# Patient Record
Sex: FEMALE | Race: WHITE | NOT HISPANIC OR LATINO | URBAN - METROPOLITAN AREA
[De-identification: names, ages, dates, MRNs, and addresses within clinical notes are randomized per-mention and may not be internally consistent; named-entity substitution may affect disease eponyms.]

---

## 2020-01-01 ENCOUNTER — INPATIENT (INPATIENT)
Facility: HOSPITAL | Age: 85
LOS: 0 days | DRG: 871 | End: 2020-05-08
Attending: INTERNAL MEDICINE | Admitting: INTERNAL MEDICINE
Payer: MEDICARE

## 2020-01-01 VITALS — HEART RATE: 58 BPM | RESPIRATION RATE: 28 BRPM | OXYGEN SATURATION: 86 %

## 2020-01-01 VITALS — SYSTOLIC BLOOD PRESSURE: 140 MMHG | HEART RATE: 67 BPM | DIASTOLIC BLOOD PRESSURE: 68 MMHG

## 2020-01-01 LAB
ALBUMIN SERPL ELPH-MCNC: 1.8 G/DL — LOW (ref 3.3–5)
ALBUMIN SERPL ELPH-MCNC: 2.4 G/DL — LOW (ref 3.3–5)
ALP SERPL-CCNC: 111 U/L — SIGNIFICANT CHANGE UP (ref 40–120)
ALP SERPL-CCNC: 139 U/L — HIGH (ref 40–120)
ALT FLD-CCNC: 38 U/L — SIGNIFICANT CHANGE UP (ref 10–45)
ALT FLD-CCNC: 48 U/L — HIGH (ref 10–45)
ANION GAP SERPL CALC-SCNC: 18 MMOL/L — HIGH (ref 5–17)
ANION GAP SERPL CALC-SCNC: 19 MMOL/L — HIGH (ref 5–17)
ANISOCYTOSIS BLD QL: SLIGHT — SIGNIFICANT CHANGE UP
APTT BLD: 31.3 SEC — SIGNIFICANT CHANGE UP (ref 27.5–36.3)
APTT BLD: 57.8 SEC — HIGH (ref 27.5–36.3)
AST SERPL-CCNC: 66 U/L — HIGH (ref 10–40)
AST SERPL-CCNC: 83 U/L — HIGH (ref 10–40)
BASE EXCESS BLDA CALC-SCNC: -26 MMOL/L — LOW (ref -2–3)
BASE EXCESS BLDV CALC-SCNC: -31.4 MMOL/L — SIGNIFICANT CHANGE UP
BASOPHILS # BLD AUTO: 0 K/UL — SIGNIFICANT CHANGE UP (ref 0–0.2)
BASOPHILS # BLD AUTO: 0.03 K/UL — SIGNIFICANT CHANGE UP (ref 0–0.2)
BASOPHILS NFR BLD AUTO: 0 % — SIGNIFICANT CHANGE UP (ref 0–2)
BASOPHILS NFR BLD AUTO: 0.4 % — SIGNIFICANT CHANGE UP (ref 0–2)
BILIRUB SERPL-MCNC: 0.2 MG/DL — SIGNIFICANT CHANGE UP (ref 0.2–1.2)
BILIRUB SERPL-MCNC: 0.4 MG/DL — SIGNIFICANT CHANGE UP (ref 0.2–1.2)
BLD GP AB SCN SERPL QL: NEGATIVE — SIGNIFICANT CHANGE UP
BLD GP AB SCN SERPL QL: NEGATIVE — SIGNIFICANT CHANGE UP
BUN SERPL-MCNC: 127 MG/DL — HIGH (ref 7–23)
BUN SERPL-MCNC: 97 MG/DL — HIGH (ref 7–23)
BURR CELLS BLD QL SMEAR: PRESENT — SIGNIFICANT CHANGE UP
CALCIUM SERPL-MCNC: 7.1 MG/DL — LOW (ref 8.4–10.5)
CALCIUM SERPL-MCNC: 8.4 MG/DL — SIGNIFICANT CHANGE UP (ref 8.4–10.5)
CHLORIDE SERPL-SCNC: 112 MMOL/L — HIGH (ref 96–108)
CHLORIDE SERPL-SCNC: 119 MMOL/L — HIGH (ref 96–108)
CO2 SERPL-SCNC: 12 MMOL/L — LOW (ref 22–31)
CO2 SERPL-SCNC: 6 MMOL/L — CRITICAL LOW (ref 22–31)
CREAT SERPL-MCNC: 3.83 MG/DL — HIGH (ref 0.5–1.3)
CREAT SERPL-MCNC: 5.65 MG/DL — HIGH (ref 0.5–1.3)
CRP SERPL-MCNC: 2.28 MG/DL — HIGH (ref 0–0.4)
D DIMER BLD IA.RAPID-MCNC: HIGH NG/ML DDU
DACRYOCYTES BLD QL SMEAR: SLIGHT — SIGNIFICANT CHANGE UP
EOSINOPHIL # BLD AUTO: 0 K/UL — SIGNIFICANT CHANGE UP (ref 0–0.5)
EOSINOPHIL # BLD AUTO: 0 K/UL — SIGNIFICANT CHANGE UP (ref 0–0.5)
EOSINOPHIL NFR BLD AUTO: 0 % — SIGNIFICANT CHANGE UP (ref 0–6)
EOSINOPHIL NFR BLD AUTO: 0 % — SIGNIFICANT CHANGE UP (ref 0–6)
FERRITIN SERPL-MCNC: 1786 NG/ML — HIGH (ref 15–150)
FIBRINOGEN PPP-MCNC: 265 MG/DL — SIGNIFICANT CHANGE UP (ref 258–438)
GIANT PLATELETS BLD QL SMEAR: PRESENT — SIGNIFICANT CHANGE UP
GLUCOSE SERPL-MCNC: 234 MG/DL — HIGH (ref 70–99)
GLUCOSE SERPL-MCNC: 258 MG/DL — HIGH (ref 70–99)
HCO3 BLDA-SCNC: 6 MMOL/L — CRITICAL LOW (ref 21–28)
HCO3 BLDV-SCNC: 7 MMOL/L — CRITICAL LOW (ref 20–27)
HCT VFR BLD CALC: 23.5 % — LOW (ref 34.5–45)
HCT VFR BLD CALC: 31.7 % — LOW (ref 34.5–45)
HGB BLD-MCNC: 7.1 G/DL — LOW (ref 11.5–15.5)
HGB BLD-MCNC: 9.2 G/DL — LOW (ref 11.5–15.5)
IMM GRANULOCYTES NFR BLD AUTO: 1 % — SIGNIFICANT CHANGE UP (ref 0–1.5)
INR BLD: 1.22 — HIGH (ref 0.88–1.16)
INR BLD: 1.36 — HIGH (ref 0.88–1.16)
LACTATE SERPL-SCNC: 8.2 MMOL/L — CRITICAL HIGH (ref 0.5–2)
LYMPHOCYTES # BLD AUTO: 0.66 K/UL — LOW (ref 1–3.3)
LYMPHOCYTES # BLD AUTO: 1.01 K/UL — SIGNIFICANT CHANGE UP (ref 1–3.3)
LYMPHOCYTES # BLD AUTO: 14.7 % — SIGNIFICANT CHANGE UP (ref 13–44)
LYMPHOCYTES # BLD AUTO: 27.6 % — SIGNIFICANT CHANGE UP (ref 13–44)
MACROCYTES BLD QL: SLIGHT — SIGNIFICANT CHANGE UP
MAGNESIUM SERPL-MCNC: 0.7 MG/DL — CRITICAL LOW (ref 1.6–2.6)
MANUAL SMEAR VERIFICATION: SIGNIFICANT CHANGE UP
MCHC RBC-ENTMCNC: 29 GM/DL — LOW (ref 32–36)
MCHC RBC-ENTMCNC: 30.2 GM/DL — LOW (ref 32–36)
MCHC RBC-ENTMCNC: 31.1 PG — SIGNIFICANT CHANGE UP (ref 27–34)
MCHC RBC-ENTMCNC: 31.2 PG — SIGNIFICANT CHANGE UP (ref 27–34)
MCV RBC AUTO: 103.1 FL — HIGH (ref 80–100)
MCV RBC AUTO: 107.5 FL — HIGH (ref 80–100)
MONOCYTES # BLD AUTO: 0.02 K/UL — SIGNIFICANT CHANGE UP (ref 0–0.9)
MONOCYTES # BLD AUTO: 0.43 K/UL — SIGNIFICANT CHANGE UP (ref 0–0.9)
MONOCYTES NFR BLD AUTO: 0.9 % — LOW (ref 2–14)
MONOCYTES NFR BLD AUTO: 6.3 % — SIGNIFICANT CHANGE UP (ref 2–14)
NEUTROPHILS # BLD AUTO: 1.71 K/UL — LOW (ref 1.8–7.4)
NEUTROPHILS # BLD AUTO: 5.32 K/UL — SIGNIFICANT CHANGE UP (ref 1.8–7.4)
NEUTROPHILS NFR BLD AUTO: 71.5 % — SIGNIFICANT CHANGE UP (ref 43–77)
NEUTROPHILS NFR BLD AUTO: 77.6 % — HIGH (ref 43–77)
NRBC # BLD: 0 /100 WBCS — SIGNIFICANT CHANGE UP (ref 0–0)
NRBC # BLD: 4 /100 — HIGH (ref 0–0)
NRBC # BLD: SIGNIFICANT CHANGE UP /100 WBCS (ref 0–0)
OVALOCYTES BLD QL SMEAR: SLIGHT — SIGNIFICANT CHANGE UP
PCO2 BLDA: 47 MMHG — HIGH (ref 32–45)
PCO2 BLDV: 69 MMHG — HIGH (ref 41–51)
PH BLDA: 6.73 — CRITICAL LOW (ref 7.35–7.45)
PH BLDV: 6.64 — CRITICAL LOW (ref 7.32–7.43)
PHOSPHATE SERPL-MCNC: 6.6 MG/DL — HIGH (ref 2.5–4.5)
PLAT MORPH BLD: ABNORMAL
PLATELET # BLD AUTO: 117 K/UL — LOW (ref 150–400)
PLATELET # BLD AUTO: 87 K/UL — LOW (ref 150–400)
PO2 BLDA: 143 MMHG — HIGH (ref 83–108)
PO2 BLDV: 88 MMHG — SIGNIFICANT CHANGE UP
POIKILOCYTOSIS BLD QL AUTO: SLIGHT — SIGNIFICANT CHANGE UP
POTASSIUM SERPL-MCNC: 3.9 MMOL/L — SIGNIFICANT CHANGE UP (ref 3.5–5.3)
POTASSIUM SERPL-MCNC: 8.9 MMOL/L — CRITICAL HIGH (ref 3.5–5.3)
POTASSIUM SERPL-SCNC: 3.9 MMOL/L — SIGNIFICANT CHANGE UP (ref 3.5–5.3)
POTASSIUM SERPL-SCNC: 8.9 MMOL/L — CRITICAL HIGH (ref 3.5–5.3)
PROCALCITONIN SERPL-MCNC: 0.74 NG/ML — HIGH (ref 0.02–0.1)
PROT SERPL-MCNC: 3.3 G/DL — LOW (ref 6–8.3)
PROT SERPL-MCNC: 4.9 G/DL — LOW (ref 6–8.3)
PROTHROM AB SERPL-ACNC: 14 SEC — HIGH (ref 10–12.9)
PROTHROM AB SERPL-ACNC: 15.7 SEC — HIGH (ref 10–12.9)
RBC # BLD: 2.28 M/UL — LOW (ref 3.8–5.2)
RBC # BLD: 2.95 M/UL — LOW (ref 3.8–5.2)
RBC # FLD: 18.4 % — HIGH (ref 10.3–14.5)
RBC # FLD: 18.5 % — HIGH (ref 10.3–14.5)
RBC BLD AUTO: ABNORMAL
RH IG SCN BLD-IMP: POSITIVE — SIGNIFICANT CHANGE UP
RH IG SCN BLD-IMP: POSITIVE — SIGNIFICANT CHANGE UP
SAO2 % BLDA: 96 % — SIGNIFICANT CHANGE UP (ref 95–100)
SAO2 % BLDV: 77 % — SIGNIFICANT CHANGE UP
SARS-COV-2 RNA SPEC QL NAA+PROBE: SIGNIFICANT CHANGE UP
SCHISTOCYTES BLD QL AUTO: SLIGHT — SIGNIFICANT CHANGE UP
SMUDGE CELLS # BLD: PRESENT — SIGNIFICANT CHANGE UP
SODIUM SERPL-SCNC: 137 MMOL/L — SIGNIFICANT CHANGE UP (ref 135–145)
SODIUM SERPL-SCNC: 149 MMOL/L — HIGH (ref 135–145)
SPHEROCYTES BLD QL SMEAR: SLIGHT — SIGNIFICANT CHANGE UP
TROPONIN T SERPL-MCNC: 0.2 NG/ML — CRITICAL HIGH (ref 0–0.01)
TROPONIN T SERPL-MCNC: 0.35 NG/ML — CRITICAL HIGH (ref 0–0.01)
WBC # BLD: 2.39 K/UL — LOW (ref 3.8–10.5)
WBC # BLD: 6.86 K/UL — SIGNIFICANT CHANGE UP (ref 3.8–10.5)
WBC # FLD AUTO: 2.39 K/UL — LOW (ref 3.8–10.5)
WBC # FLD AUTO: 6.86 K/UL — SIGNIFICANT CHANGE UP (ref 3.8–10.5)

## 2020-01-01 PROCEDURE — 96376 TX/PRO/DX INJ SAME DRUG ADON: CPT | Mod: XU

## 2020-01-01 PROCEDURE — 51702 INSERT TEMP BLADDER CATH: CPT | Mod: XU

## 2020-01-01 PROCEDURE — 93005 ELECTROCARDIOGRAM TRACING: CPT | Mod: XU

## 2020-01-01 PROCEDURE — 85730 THROMBOPLASTIN TIME PARTIAL: CPT

## 2020-01-01 PROCEDURE — 71045 X-RAY EXAM CHEST 1 VIEW: CPT

## 2020-01-01 PROCEDURE — 83735 ASSAY OF MAGNESIUM: CPT

## 2020-01-01 PROCEDURE — 86850 RBC ANTIBODY SCREEN: CPT

## 2020-01-01 PROCEDURE — 84484 ASSAY OF TROPONIN QUANT: CPT

## 2020-01-01 PROCEDURE — 82962 GLUCOSE BLOOD TEST: CPT

## 2020-01-01 PROCEDURE — 83605 ASSAY OF LACTIC ACID: CPT

## 2020-01-01 PROCEDURE — 82803 BLOOD GASES ANY COMBINATION: CPT

## 2020-01-01 PROCEDURE — 99223 1ST HOSP IP/OBS HIGH 75: CPT

## 2020-01-01 PROCEDURE — 85025 COMPLETE CBC W/AUTO DIFF WBC: CPT

## 2020-01-01 PROCEDURE — 80053 COMPREHEN METABOLIC PANEL: CPT

## 2020-01-01 PROCEDURE — 84145 PROCALCITONIN (PCT): CPT

## 2020-01-01 PROCEDURE — 86901 BLOOD TYPING SEROLOGIC RH(D): CPT

## 2020-01-01 PROCEDURE — 82728 ASSAY OF FERRITIN: CPT

## 2020-01-01 PROCEDURE — 86140 C-REACTIVE PROTEIN: CPT

## 2020-01-01 PROCEDURE — 85379 FIBRIN DEGRADATION QUANT: CPT

## 2020-01-01 PROCEDURE — 84100 ASSAY OF PHOSPHORUS: CPT

## 2020-01-01 PROCEDURE — 92950 HEART/LUNG RESUSCITATION CPR: CPT

## 2020-01-01 PROCEDURE — 99291 CRITICAL CARE FIRST HOUR: CPT | Mod: 25

## 2020-01-01 PROCEDURE — 99291 CRITICAL CARE FIRST HOUR: CPT

## 2020-01-01 PROCEDURE — 71045 X-RAY EXAM CHEST 1 VIEW: CPT | Mod: 26

## 2020-01-01 PROCEDURE — 99292 CRITICAL CARE ADDL 30 MIN: CPT | Mod: 25

## 2020-01-01 PROCEDURE — 36415 COLL VENOUS BLD VENIPUNCTURE: CPT

## 2020-01-01 PROCEDURE — 85610 PROTHROMBIN TIME: CPT

## 2020-01-01 PROCEDURE — 96375 TX/PRO/DX INJ NEW DRUG ADDON: CPT | Mod: XU

## 2020-01-01 PROCEDURE — 96365 THER/PROPH/DIAG IV INF INIT: CPT | Mod: XU

## 2020-01-01 PROCEDURE — 87040 BLOOD CULTURE FOR BACTERIA: CPT

## 2020-01-01 PROCEDURE — 99292 CRITICAL CARE ADDL 30 MIN: CPT

## 2020-01-01 PROCEDURE — 85384 FIBRINOGEN ACTIVITY: CPT

## 2020-01-01 PROCEDURE — 93010 ELECTROCARDIOGRAM REPORT: CPT | Mod: 59

## 2020-01-01 PROCEDURE — 87635 SARS-COV-2 COVID-19 AMP PRB: CPT

## 2020-01-01 RX ORDER — SODIUM BICARBONATE 1 MEQ/ML
100 SYRINGE (ML) INTRAVENOUS ONCE
Refills: 0 | Status: COMPLETED | OUTPATIENT
Start: 2020-01-01 | End: 2020-01-01

## 2020-01-01 RX ORDER — SODIUM BICARBONATE 1 MEQ/ML
50 SYRINGE (ML) INTRAVENOUS ONCE
Refills: 0 | Status: COMPLETED | OUTPATIENT
Start: 2020-01-01 | End: 2020-01-01

## 2020-01-01 RX ORDER — PANTOPRAZOLE SODIUM 20 MG/1
8 TABLET, DELAYED RELEASE ORAL
Qty: 80 | Refills: 0 | Status: DISCONTINUED | OUTPATIENT
Start: 2020-01-01 | End: 2020-01-01

## 2020-01-01 RX ORDER — NOREPINEPHRINE BITARTRATE/D5W 8 MG/250ML
0.05 PLASTIC BAG, INJECTION (ML) INTRAVENOUS
Qty: 8 | Refills: 0 | Status: DISCONTINUED | OUTPATIENT
Start: 2020-01-01 | End: 2020-01-01

## 2020-01-01 RX ORDER — FENTANYL CITRATE 50 UG/ML
100 INJECTION INTRAVENOUS ONCE
Refills: 0 | Status: DISCONTINUED | OUTPATIENT
Start: 2020-01-01 | End: 2020-01-01

## 2020-01-01 RX ORDER — CHLORHEXIDINE GLUCONATE 213 G/1000ML
15 SOLUTION TOPICAL EVERY 12 HOURS
Refills: 0 | Status: DISCONTINUED | OUTPATIENT
Start: 2020-01-01 | End: 2020-01-01

## 2020-01-01 RX ORDER — DEXTROSE 50 % IN WATER 50 %
50 SYRINGE (ML) INTRAVENOUS ONCE
Refills: 0 | Status: COMPLETED | OUTPATIENT
Start: 2020-01-01 | End: 2020-01-01

## 2020-01-01 RX ORDER — SODIUM ZIRCONIUM CYCLOSILICATE 10 G/10G
10 POWDER, FOR SUSPENSION ORAL ONCE
Refills: 0 | Status: DISCONTINUED | OUTPATIENT
Start: 2020-01-01 | End: 2020-01-01

## 2020-01-01 RX ORDER — INSULIN HUMAN 100 [IU]/ML
10 INJECTION, SOLUTION SUBCUTANEOUS ONCE
Refills: 0 | Status: COMPLETED | OUTPATIENT
Start: 2020-01-01 | End: 2020-01-01

## 2020-01-01 RX ORDER — PANTOPRAZOLE SODIUM 20 MG/1
80 TABLET, DELAYED RELEASE ORAL ONCE
Refills: 0 | Status: COMPLETED | OUTPATIENT
Start: 2020-01-01 | End: 2020-01-01

## 2020-01-01 RX ORDER — CALCIUM GLUCONATE 100 MG/ML
1 VIAL (ML) INTRAVENOUS ONCE
Refills: 0 | Status: DISCONTINUED | OUTPATIENT
Start: 2020-01-01 | End: 2020-01-01

## 2020-01-01 RX ORDER — SODIUM CHLORIDE 9 MG/ML
1000 INJECTION INTRAMUSCULAR; INTRAVENOUS; SUBCUTANEOUS ONCE
Refills: 0 | Status: COMPLETED | OUTPATIENT
Start: 2020-01-01 | End: 2020-01-01

## 2020-01-01 RX ORDER — VANCOMYCIN HCL 1 G
1000 VIAL (EA) INTRAVENOUS ONCE
Refills: 0 | Status: COMPLETED | OUTPATIENT
Start: 2020-01-01 | End: 2020-01-01

## 2020-01-01 RX ORDER — PIPERACILLIN AND TAZOBACTAM 4; .5 G/20ML; G/20ML
3.38 INJECTION, POWDER, LYOPHILIZED, FOR SOLUTION INTRAVENOUS ONCE
Refills: 0 | Status: COMPLETED | OUTPATIENT
Start: 2020-01-01 | End: 2020-01-01

## 2020-01-01 RX ORDER — FENTANYL CITRATE 50 UG/ML
0.5 INJECTION INTRAVENOUS
Qty: 2500 | Refills: 0 | Status: DISCONTINUED | OUTPATIENT
Start: 2020-01-01 | End: 2020-01-01

## 2020-01-01 RX ORDER — SODIUM POLYSTYRENE SULFONATE 4.1 MEQ/G
30 POWDER, FOR SUSPENSION ORAL ONCE
Refills: 0 | Status: DISCONTINUED | OUTPATIENT
Start: 2020-01-01 | End: 2020-01-01

## 2020-01-01 RX ORDER — ETOMIDATE 2 MG/ML
20 INJECTION INTRAVENOUS ONCE
Refills: 0 | Status: COMPLETED | OUTPATIENT
Start: 2020-01-01 | End: 2020-01-01

## 2020-01-01 RX ORDER — SUCCINYLCHOLINE CHLORIDE 100 MG/5ML
150 SYRINGE (ML) INTRAVENOUS ONCE
Refills: 0 | Status: COMPLETED | OUTPATIENT
Start: 2020-01-01 | End: 2020-01-01

## 2020-01-01 RX ORDER — PROPOFOL 10 MG/ML
5 INJECTION, EMULSION INTRAVENOUS
Qty: 1000 | Refills: 0 | Status: DISCONTINUED | OUTPATIENT
Start: 2020-01-01 | End: 2020-01-01

## 2020-01-01 RX ADMIN — Medication 50 MILLILITER(S): at 12:17

## 2020-01-01 RX ADMIN — Medication 250 MILLIGRAM(S): at 10:45

## 2020-01-01 RX ADMIN — FENTANYL CITRATE 3.25 MICROGRAM(S)/KG/HR: 50 INJECTION INTRAVENOUS at 14:31

## 2020-01-01 RX ADMIN — SODIUM CHLORIDE 1000 MILLILITER(S): 9 INJECTION INTRAMUSCULAR; INTRAVENOUS; SUBCUTANEOUS at 12:20

## 2020-01-01 RX ADMIN — PROPOFOL 1.95 MICROGRAM(S)/KG/MIN: 10 INJECTION, EMULSION INTRAVENOUS at 13:31

## 2020-01-01 RX ADMIN — PIPERACILLIN AND TAZOBACTAM 3.38 GRAM(S): 4; .5 INJECTION, POWDER, LYOPHILIZED, FOR SOLUTION INTRAVENOUS at 11:15

## 2020-01-01 RX ADMIN — Medication 50 MILLIEQUIVALENT(S): at 11:43

## 2020-01-01 RX ADMIN — Medication 50 MILLIEQUIVALENT(S): at 11:10

## 2020-01-01 RX ADMIN — SODIUM CHLORIDE 1000 MILLILITER(S): 9 INJECTION INTRAMUSCULAR; INTRAVENOUS; SUBCUTANEOUS at 12:04

## 2020-01-01 RX ADMIN — Medication 6.09 MICROGRAM(S)/KG/MIN: at 15:01

## 2020-01-01 RX ADMIN — PROPOFOL 1.95 MICROGRAM(S)/KG/MIN: 10 INJECTION, EMULSION INTRAVENOUS at 10:46

## 2020-01-01 RX ADMIN — Medication 150 MILLIGRAM(S): at 09:56

## 2020-01-01 RX ADMIN — Medication 6.09 MICROGRAM(S)/KG/MIN: at 10:44

## 2020-01-01 RX ADMIN — Medication 100 MILLIEQUIVALENT(S): at 12:04

## 2020-01-01 RX ADMIN — PANTOPRAZOLE SODIUM 80 MILLIGRAM(S): 20 TABLET, DELAYED RELEASE ORAL at 12:22

## 2020-01-01 RX ADMIN — FENTANYL CITRATE 3.25 MICROGRAM(S)/KG/HR: 50 INJECTION INTRAVENOUS at 14:32

## 2020-01-01 RX ADMIN — Medication 6.09 MICROGRAM(S)/KG/MIN: at 14:00

## 2020-01-01 RX ADMIN — PIPERACILLIN AND TAZOBACTAM 200 GRAM(S): 4; .5 INJECTION, POWDER, LYOPHILIZED, FOR SOLUTION INTRAVENOUS at 10:45

## 2020-01-01 RX ADMIN — FENTANYL CITRATE 100 MICROGRAM(S): 50 INJECTION INTRAVENOUS at 10:00

## 2020-01-01 RX ADMIN — INSULIN HUMAN 10 UNIT(S): 100 INJECTION, SOLUTION SUBCUTANEOUS at 12:14

## 2020-01-01 RX ADMIN — ETOMIDATE 20 MILLIGRAM(S): 2 INJECTION INTRAVENOUS at 09:55

## 2020-01-01 RX ADMIN — SODIUM CHLORIDE 1000 MILLILITER(S): 9 INJECTION INTRAMUSCULAR; INTRAVENOUS; SUBCUTANEOUS at 11:54

## 2020-05-08 NOTE — ED PROVIDER NOTE - GASTROINTESTINAL, MLM
Unable to assess. No pulsatile abdominal mass. Coffee ground coming from OG tube s/p placement; approximately 100cc's.

## 2020-05-08 NOTE — ED PROVIDER NOTE - CLINICAL SUMMARY MEDICAL DECISION MAKING FREE TEXT BOX
83 y/o F found in PEA arrest with ROSC prior to arrival s/p 20 min CPR and x 3 Epi; pt intubated with Vlad tube which was replaced upon arrival. Pt maintained steady blood pressures and at times noted to have HR in 40's, concerning for sick sinus syndrome with low blood pressure correlating. Pt paced, had good capture with transcutaneous pacing, resuscitated with IV fluids, pressors, antibiotics. Rectal temp 94. Concern for perhaps cardiac arrest to secondary to underlying infections or metabolic cause vs COVID19. Plan continue supportive care, ICU treatment, and understand goals of care. Cardiology and ICU team consulted; pending disposition.

## 2020-05-08 NOTE — DISCHARGE NOTE FOR THE EXPIRED PATIENT - HOSPITAL COURSE
84F unknown PMH presented after collapse at home- witnessed by HHA. Unclear amount of time down at home. EMS arrived for which patient found in PEA arrest- for which CPR performed for 20 minutes with ROSC.patient intubated, unresponsive, mottled appearing, cool extremities, coffee ground emesis via NGT. VS were T 94.8F, HR 67, BP initially 110s/60s but then dropped to 80s/40s with bradycardia (irregular rhythm on tele), RR 18, SPO2 98% intubated on mechanical ventilation at AC//18/5/100% FiO2. EKG and tele with irregular rhythm- intermittent sinus per, junctional beats, runs of AFib, incomplete sinus pauses pointing towards SSS; cardiology consulted and patient started on levophed and transcutaneous pacing with good capture and improvement in BP. Patient placed on cooling protocol. Patient given 2L NS, Vancomycin and Zosyn and started on protonix gtt. VBG revealed severe acidemia pH 6.4 therefore patient given 3 amps bicarb and RR increased to 24. Bedside ECHO with good cardiac function, mild dilation in RV, collapsing IVC. Labs then revealed hyperkalemia to 8.9 with Scr 5.65, therefore patient given calcium gluconate, insulin and dextrose; renal consulted.  ABG with mild improvement in pH 6.7 (however, this is before bicarb and RR adjusted), patient given additional 2 amps bicarb to total 5. CXR with patchy b/l opacities. Patient COVID swabbed, high risk therefore admitted to [facility] MICU. 84F unknown PMH presented after collapse at home- witnessed by HHA. Unclear amount of time down at home. EMS arrived for which patient found in PEA arrest- for which CPR performed for 20 minutes with ROSC.patient intubated, unresponsive, mottled appearing, cool extremities, coffee ground emesis via NGT. VS were T 94.8F, HR 67, BP initially 110s/60s but then dropped to 80s/40s with bradycardia (irregular rhythm on tele), RR 18, SPO2 98% intubated on mechanical ventilation at AC//18/5/100% FiO2. EKG and tele with irregular rhythm- intermittent sinus per, junctional beats, runs of AFib, incomplete sinus pauses pointing towards SSS; cardiology consulted and patient started on levophed and transcutaneous pacing with good capture and improvement in BP. Patient placed on cooling protocol. Patient given 2L NS, Vancomycin and Zosyn and started on protonix gtt. VBG revealed severe acidemia pH 6.4 therefore patient given 3 amps bicarb and RR increased to 24. Bedside ECHO with good cardiac function, mild dilation in RV, collapsing IVC. Labs then revealed hyperkalemia to 8.9 with Scr 5.65, therefore patient given calcium gluconate, insulin and dextrose; renal consulted.  ABG with mild improvement in pH 6.7 (however, this is before bicarb and RR adjusted), patient given additional 2 amps bicarb to total 5. CXR with patchy b/l opacities. Patient COVID swabbed, high risk therefore admitted to COVID MICU. After being able to get a hold of family/power of , decision was made to withdrawal care- discontinue pressors and not pursue HD. Patient went into PEA arrest at 3:50 for which family notified. 84F unknown PMH presented after collapse at home- witnessed by HHA. Unclear amount of time down at home. EMS arrived for which patient found in PEA arrest- for which CPR performed for 20 minutes with ROSC.patient intubated, unresponsive, mottled appearing, cool extremities, coffee ground emesis via NGT. VS were T 94.8F, HR 67, BP initially 110s/60s but then dropped to 80s/40s with bradycardia (irregular rhythm on tele), RR 18, SPO2 98% intubated on mechanical ventilation at AC//18/5/100% FiO2. EKG and tele with irregular rhythm- intermittent sinus per, junctional beats, runs of AFib, incomplete sinus pauses pointing towards SSS; cardiology consulted and patient started on levophed and transcutaneous pacing with good capture and improvement in BP. Patient placed on cooling protocol. Patient given 2L NS, Vancomycin and Zosyn and started on protonix gtt. VBG revealed severe acidemia pH 6.4 therefore patient given 3 amps bicarb and RR increased to 24. Bedside ECHO with good cardiac function, mild dilation in RV, collapsing IVC. Labs then revealed hyperkalemia to 8.9 with Scr 5.65, therefore patient given calcium gluconate, insulin and dextrose; renal consulted.  ABG with mild improvement in pH 6.7 (however, this is before bicarb and RR adjusted), patient given additional 2 amps bicarb to total 5. CXR with patchy b/l opacities. Patient COVID swabbed, high risk therefore admitted to COVID MICU. After being able to get a hold of family/power of , decision was made to withdrawal care- discontinue pressors and not pursue HD. Patient went into PEA arrest at 3:50 for which family notified. Spoke with pts power at  at length

## 2020-05-08 NOTE — CONSULT NOTE ADULT - ASSESSMENT
84F unknown PMH presented after collapse at home- witnessed by HHA. Unclear amount of time down at home. EMS arrived for which patient found in PEA arrest- for which CPR performed for 20 minutes with ROSC.  NEphrology consult placed in regards to Hyperkalemia, severe metabolic acidosis and regina.     Regina associated with severe metabolic acidosis and hyperkalemia  ideal treatment would be Hemodialysis given criticality   however, as pt is DNR, and her wishes were to avoid any aggressive or invasive interventions- will defer Any form RRt  Nephrology will sign off  case discussed with primary team and Attending

## 2020-05-08 NOTE — ED ADULT NURSE NOTE - NSIMPLEMENTINTERV_GEN_ALL_ED
Implemented All Fall Risk Interventions:  Skippers to call system. Call bell, personal items and telephone within reach. Instruct patient to call for assistance. Room bathroom lighting operational. Non-slip footwear when patient is off stretcher. Physically safe environment: no spills, clutter or unnecessary equipment. Stretcher in lowest position, wheels locked, appropriate side rails in place. Provide visual cue, wrist band, yellow gown, etc. Monitor gait and stability. Monitor for mental status changes and reorient to person, place, and time. Review medications for side effects contributing to fall risk. Reinforce activity limits and safety measures with patient and family.

## 2020-05-08 NOTE — H&P ADULT - ATTENDING COMMENTS
I have examined this patient who was brought to us in the MICU from the ER  mm Hg.  Patient was intubated and mechanically ventilated.  Her blood pressure was approximately 70 systolic.  She has been started on vasopressors including vasopressin.  She was in severe metabolic acidosis with grossly elevated lactic acid.  Her electrolytes were deranged with the very high hyperkalemia.  The stabilize the patient by giving more intravenous normal saline.  We have increased gradually the Levophed drip.  The patient's systolic blood pressure is now approximately 90.  We were in the process of inserting a dialysis catheter so that the patient could be dialyzed.  Already started patient on broad-spectrum antibiotics.  Spoke with Mr. Kelsie Veneags, the patient's power of . I had a prolonged conversation with him. He stated that the patient had expressed to him that she did not want to be on life support systems. He stated that in consonance with her wishes, we should not give her vasopressors or perform dialysis. We should leave the patient on the ventilator and let her go peacefully.  I hacve asked him to fax the power of  form and to state his wishes in writing.  Once we got his stated wishes, we withdrew all vasopressors and did not put in any dialysis caths etc.  The patient passed peacefully

## 2020-05-08 NOTE — H&P ADULT - ASSESSMENT
84F PMH unknown presented after PEA arrest at home with ROSC for which patient admitted to MICU. Concern for acute renal failure 84F PMH unknown presented after PEA arrest at home with ROSC for which patient admitted to MICU. Concern for acute renal failure    Neuro  - sedated on propofol and fentanyl  - hypothermia protocol    CV  - Septic shock; on levophed, titrate to MAP >65  - S/p PEA arrest: suspicion for cardiac etiology given that collapsed at home, however multiple metabolic derangements on labs    - s/p epinephrine x3, ROSC after 20 minutes CPR    - cooling protocol    - collateral from [facility] as to how long patient down prior to chest compressions  - SSS: related to underlying metabolic derangements vs primary given age    - L cordis placement in case of TVP, external back-up pacing if needed    - fix metabolic derangements including hyperkalemia    - cardiology following     Resp  - CXR with bilateral patchy opacifications  - Intubated on MV settings Ac//24/5/100; titrate FiO2 as tolerated  - Trend ABG  - f/u COVID swab, obtain sputum cxs if possible  - f/u D-dimer, low suspicion for PE however RV slightly dilated    Renal  - AGMA- Likely 2/2 uremia and lactic acidemia.     - s/p 5 amps bicarb    - trend pH    - obtain BHB    Renal Failure- unclear if acute or acute on chronic    - Renal consulted for urgent dialysis given acidemia and hyperkalemia    - Indwelling Mejia catheter in place- monitor UOP    - obtain collateral    Hyperkalemia    - Lokelma 10, insulin 10, Dextrose, calcium gluconate    - treat acidemia, s/p 5 amps bicarb, f/u repeat ABG    - renal consulted for urgent dialysis     GI  - Concern for UGIB- coffee ground emesis    - continue Protonix infusion    - monitor Hgb, maintain active T&S    - consider r/o ischemic colitis given elevated concomittant elevated lactate.     ID- no leukocytosis and bandemia. CXR with b/l patchy opacifications concerning for COVID  - continue Vancomycin and Zosyn and 2L NS  - f/u UA, Ucxs, Bcxs, COVID swab 84F PMH unknown presented after PEA arrest at home with ROSC for which patient admitted to MICU. Concern for hypovolemic, septic shock picture. Also complicated by acute renal failure and severe metabolic acidosis.     Neuro  - Given that patient wanted to withdraw extreme measures and make patient comfortable, fentanyl gtt added for comfort    CV  - Septic shock; on levophed, titrate to MAP >65  - S/p PEA arrest: Multiple metabolic derrangements- concern for Septic and hypovolemic shock.     - s/p epinephrine x3, ROSC after 20 minutes CPR    - cooling protocol initially started but now focused on comfort measures      Resp  - CXR with bilateral patchy opacifications  - Intubated on MV settings Ac//24/5/100; titrate FiO2 as tolerated  - Now intubated but will continue with GOC discussions for potential terminal wean.    Renal  - Renal failure, AGMA     - s/p 5 amps bicarb    - Renal consulted for urgent HD but given discussion with family- will hold off due to that would not be in line with patients wishes.      GI  - Concern for UGIB- coffee ground emesis    - Initially started on protonix infusion  -  Based on discussion with family, will focus on comfort at this time.    ID  Septic shock  -given hypothermia, there is concern for a component of septic shock  - Family has indicated they do not want invasive procedures/lines or antibiotics at this time    Dispo: MICU

## 2020-05-08 NOTE — ED PROVIDER NOTE - CARE PLAN
Principal Discharge DX:	Cardiopulmonary arrest  Secondary Diagnosis:	Hyperkalemia  Secondary Diagnosis:	Severe sepsis

## 2020-05-08 NOTE — CONSULT NOTE ADULT - SUBJECTIVE AND OBJECTIVE BOX
Cardiology fellow Consult note    HPI: 84F with HTN, NIDDM was BIBEMS after a witness syncope with a PEA arrest. Post rosc patient was found to have extensive atrial arrhythmias and bradyarrhythmias, briefly requiring transcutaneous pacing. Patient was then found to have sinus tachycardia off pacing. She was then also found to have K of 8 and Cr of 5 and Bicarb of 6. We were consulted for the post rosc arrhthymias.  The patient has no records in our system and is not accompanied with family or relatives. Her history is extremely limited at this time    OP Cardiologist: Unclear.    ROS: A 10-point review of systems was otherwise negative.    PAST MEDICAL & SURGICAL HISTORY:      SOCIAL HISTORY: Unclear  FAMILY HISTORY: Unclear      ALLERGIES: 	  Allergy Status Unknown      MEDICATIONS:  calcium gluconate IVPB 1 IV Intermittent once  chlorhexidine 0.12% Liquid 15 Oral Mucosa every 12 hours  norepinephrine Infusion 0.05 IV Continuous <Continuous>  pantoprazole Infusion 8 IV Continuous <Continuous>  propofol Infusion 5 IV Continuous <Continuous>  sodium bicarbonate  Injectable 100 IV Push once  sodium chloride 0.9% Bolus 1000 IV Bolus once  sodium zirconium cyclosilicate 10 Oral once      PHYSICAL EXAM:  T(C): 34.9 (05-08-20 @ 10:20), Max: 34.9 (05-08-20 @ 10:20)  T(F): 94.8 (05-08-20 @ 10:20), Max: 94.8 (05-08-20 @ 10:20)  HR: 97 (05-08-20 @ 12:21) (67 - 120)  BP: 134/60 (05-08-20 @ 12:21) (89/40 - 140/68)  BP(mean): 81 (05-08-20 @ 10:15) (58 - 96)  RR: 24 (05-08-20 @ 12:21) (18 - 24)  SpO2: 97% (05-08-20 @ 12:21) (97% - 98%)      GEN: Intubated sedated. Over all mottled appearance   HEENT: Mucosa moist. No JVD. Blue lips  RESP: CTA b/l  CV: Tachycardic normal s1/s2. No m/r/g.  ABD: Soft, NTND. BS+. Mottled  EXT: Cold, clamped. PP not palpable  NEURO: AAOx3. No focal deficits.    I&O's Summary      	  LABS:	 	                          9.2    6.86  )-----------( 117      ( 08 May 2020 10:35 )             31.7     05-08    137  |  112<H>  |  127<H>  ----------------------------<  258<H>  8.9<HH>   |  6<LL>  |  5.65<H>    Ca    7.1<L>      08 May 2020 10:35    TPro  4.9<L>  /  Alb  2.4<L>  /  TBili  0.2  /  DBili  x   /  AST  83<H>  /  ALT  48<H>  /  AlkPhos  139<H>  05-08    CARDIAC MARKERS ( 08 May 2020 10:35 )  x     / 0.20 ng/mL / x     / x     / x          PT/INR - ( 08 May 2020 10:35 )   PT: 14.0 sec;   INR: 1.22          PTT - ( 08 May 2020 10:35 )  PTT:57.8 sec  proBNP:   Lipid Profile:   HgA1c:       TELEMETRY: Sinus tachycardia on tele	    ECG: Currently sinus tachycardia, IVCD. Rhythm strips post ROSC reveal Sinus bradycardia with intermittent sinus pauses, junctional beats and PAT indicative of sick sinus disease 	  ECHO: None  STRESS: None  CATH: None

## 2020-05-08 NOTE — ED PROCEDURE NOTE - NS_EDPROVIDERDISPOUSERTYPE_ED_A_ED
Scribe Attestation (For Scribes USE Only)...

## 2020-05-08 NOTE — ED PROVIDER NOTE - OBJECTIVE STATEMENT
83 y/o F with PMHx of HTN, DM BIBA post cardiac arrest due to unknown cause. Per EMS, pt was walking to the bathroom and suddenly collapsed; unclear baseline state of health. Pt received x 3 Epi and received ROSC after 20 min of CPR which found pt to be in PEA arrest. Pt now arrives unresponsive with Vlad tube in place and unable to provide hx.

## 2020-05-08 NOTE — CONSULT NOTE ADULT - ATTENDING COMMENTS
as above witnessed arrest-   now with hyperkalemia metabolic acidosis and LINA  needs dialysis  making arrangements when notified that family prefers no interventions  now DNR-  will cancel HD arrangements

## 2020-05-08 NOTE — CONSULT NOTE ADULT - ASSESSMENT
84F with HTN, NIDDM BIBEMS for PEA arrest and found bradyarrhythmias and atrial arrhythmias post rosc in the setting of acute renal failure with hyperkalemia to 8.9 and bicarbonate of 6 with an extremely high suspicion of COVID infection    - Likely 2/2 Severe sinus node dysfunction 2/2 acute renal failure with severe hyperkalemia, given the age she could have underlying sick sinus disease.  - MICU team to perform emergent dialysis to correct the hyperkalemia.   - Recommend placing a Cordis insertion catheter in IJ in anticipation of emergently floating a TVP if needed. No indication at this time  - Recommend having the transcutaneous pacer pads   - PPM discussion will depend on neurological recovery and overall prognosis of the patient.   - Would all AV chuck blocking agents at this time.       Please refer to Cardiology attending addendum section for final recs. Plan discussed with MICU consult fellow, EP and ER attending   Will continue to follow  Thank you for allowing to participate in the care of this patient    JANEE Marshall  Cardiology fellow, PGY 6 84F with HTN, NIDDM BIBEMS for PEA arrest and found bradyarrhythmias and atrial arrhythmias post rosc in the setting of acute renal failure with hyperkalemia to 8.9 and bicarbonate of 6 with an extremely high suspicion of COVID infection    - Likely 2/2 Severe sinus node dysfunction 2/2 acute renal failure with severe hyperkalemia, given the age she could have underlying sick sinus disease.  - MICU team to perform emergent dialysis to correct the hyperkalemia.   - Recommend placing a Cordis insertion catheter in IJ in anticipation of emergently floating a TVP if needed. No indication at this time  - Recommend having the transcutaneous pacer pads   - PPM discussion will depend on neurological recovery and overall prognosis of the patient.   - Would all AV chuck blocking agents at this time.       Please refer to Cardiology attending addendum section for final recs. Plan discussed with MICU consult fellow, EP and ER attending   Thank you for allowing to participate in the care of this patient. Please call us back if any further questions or concerns    JANEE Marshall  Cardiology fellow, PGY 6

## 2020-05-08 NOTE — ED PROVIDER NOTE - CPE EDP [facility] NORM
Child protective services  [phone] called office closed. Call placed to [facility] AND [facility] office at [phone] spoke to [facility] transferred to [facility] police department [phone]. Spoke to [facility] and communicated all prior calls.       Willie Osuna RN  01/22/18 Hortensia Anderson  01/22/18 9405 normal...

## 2020-05-08 NOTE — H&P ADULT - NSHPPHYSICALEXAM_GEN_ALL_CORE
General: Intubated, unresponsive, no acute distress  HEENT: NC/AT, noted pallor, coffee ground emesis in sump  CV: S1, S2  Resp: No longer overbreathing vent, intubated, CTA b/l  GI: Soft, nondistended. Noted coffee ground emesis  Extremities: mottled, cool extremities

## 2020-05-08 NOTE — ED ADULT TRIAGE NOTE - OTHER COMPLAINTS
per ems, pt using the toilet, cardiac arrest, performed CPR. given epi x3 by ems. intubated by ems prior to arrival. unknown Hx.

## 2020-05-08 NOTE — CONSULT NOTE ADULT - SUBJECTIVE AND OBJECTIVE BOX
REnal consult    84F unknown PMH presented after collapse at home- witnessed by HHA. Unclear amount of time down at home. EMS arrived for which patient found in PEA arrest- for which CPR performed for 20 minutes with ROSC.  NEphrology consult placed in regards to Hyperkalemia, severe metabolic acidosis and keren.   Baseline cr unknown.   Medications list- unknown   ED course- several amps of bicarbonate, calcium gluconate, lokelma, insulin + glucose      PAST MEDICAL & SURGICAL HISTORY:      Allergies    Allergy Status Unknown    Intolerances        FAMILY HISTORY: unknown       SOCIAL HISTORY: unknown     MEDICATIONS  (STANDING):  calcium gluconate IVPB 1 Gram(s) IV Intermittent once  chlorhexidine 0.12% Liquid 15 milliLiter(s) Oral Mucosa every 12 hours  fentaNYL   Infusion. 0.5 MICROgram(s)/kG/Hr (3.25 mL/Hr) IV Continuous <Continuous>  norepinephrine Infusion 0.05 MICROgram(s)/kG/Min (6.09 mL/Hr) IV Continuous <Continuous>  pantoprazole Infusion 8 mG/Hr (10 mL/Hr) IV Continuous <Continuous>  propofol Infusion 5 MICROgram(s)/kG/Min (1.95 mL/Hr) IV Continuous <Continuous>  sodium zirconium cyclosilicate 10 Gram(s) Oral once    MEDICATIONS  (PRN):      REVIEW OF SYSTEMS:  unable to obtain- intubated      PHYSICAL EXAM: /54, , RR 18, osat 98 % intubated  GENERAL: intubated, sedated  HEAD:  Atraumatic, Normocephalic,   EYES: Bilateral conjuctiva and sclera normal,  Oral cavity: ET  NECK: Neck supple, No JVD  CHEST/LUNG: connected to vent  HEART: tachycardic  ABDOMEN: Soft, Nontender, BS+nt, No flank tenderness.   EXTREMITIES: No clubbing, cyanosis, or edema, no calf tenderness  Neurology: sedated, intubated  SKIN: No rashes or lesions      CAPILLARY BLOOD GLUCOSE      POCT Blood Glucose.: 113 mg/dL (08 May 2020 09:53)      I&O's Summary        LABS:                            7.1    2.39  )-----------( 87       ( 08 May 2020 14:21 )             23.5       PT/INR - ( 08 May 2020 14:21 )   PT: 15.7 sec;   INR: 1.36          PTT - ( 08 May 2020 14:21 )  PTT:31.3 sec  CARDIAC MARKERS ( 08 May 2020 14:21 )  x     / 0.35 ng/mL / x     / x     / x      CARDIAC MARKERS ( 08 May 2020 10:35 )  x     / 0.20 ng/mL / x     / x     / x              RADIOLOGY & ADDITIONAL TESTS:    EKG/Telemetry: Reviewed

## 2020-05-08 NOTE — H&P ADULT - HISTORY OF PRESENT ILLNESS
84F unknown PMH  presented after collapse at home- witnessed by HHA. Unclear amount of time down at home. EMS arrived for which patient found in PEA arrest- for which CPR performed for 20 minutes with ROSC. 84F unknown PMH presented after collapse at home- witnessed by HHA. Unclear amount of time down at home. EMS arrived for which patient found in PEA arrest- for which CPR performed for 20 minutes with ROSC.patient intubated, unresponsive, mottled appearing, cool extremities, coffee ground emesis via NGT. VS were T 94.8F, HR 67, BP initially 110s/60s but then dropped to 80s/40s with bradycardia (irregular rhythm on tele), RR 18, SPO2 98% intubated on mechanical ventilation at AC//18/5/100% FiO2. EKG and tele with irregular rhythm- intermittent sinus per, junctional beats, runs of AFib, incomplete sinus pauses pointing towards SSS; cardiology consulted and patient started on levophed and transcutaneous pacing with good capture and improvement in BP. Patient placed on cooling protocol. Patient given 2L NS, Vancomycin and Zosyn and started on protonix gtt. VBG revealed severe acidemia pH 6.4 therefore patient given 3 amps bicarb and RR increased to 24. Bedside ECHO with good cardiac function, mild dilation in RV, collapsing IVC. Labs then revealed hyperkalemia to 8.9 with Scr 5.65, therefore patient given calcium gluconate, insulin and dextrose; renal consulted.  ABG with mild improvement in pH 6.7 (however, this is before bicarb and RR adjusted), patient given additional 2 amps bicarb to total 5. CXR with patchy b/l opacities. Patient COVID swabbed, high risk therefore admitted to [facility] MICU. 84F unknown PMH presented after collapse at home- witnessed by HHA. Unclear amount of time down at home. EMS arrived for which patient found in PEA arrest- for which CPR performed for 20 minutes with ROSC.     At [facility] ED, VS were T 94.8F, HR 67, BP initially 110s/60s but then dropped to 80s/40s with bradycardia (irregular rhythm on tele), RR 18, SPO2 98% intubated on mechanical ventilation at AC//18/5/100% FiO2. Patient noted to be intubated, unresponsive, mottled appearing, cool extremities. Coffee ground emesis noted in NGT.     EKG and tele with irregular rhythm- intermittent sinus per, junctional beats, runs of AFib, incomplete sinus pauses with concern for sick sinus syndrome for which cardiology consulted. Initiated levophed, transcutaneous pacing with improved  Patient placed on cooling protocol.     Patient given 2L NS, Vancomycin and Zosyn and started on protonix gtt. VBG demonstrated severe acidemia pH 6.4 -- s/p 3 amps bicarb. Labs also notable fo rhyperkalemia to 8.9 with Scr 5.65, therefore patient given calcium gluconate, insulin and dextrose; renal consulted.  ABG with mild improvement in pH 6.7 (however, this is before bicarb and RR adjusted), patient given additional 2 amps bicarb to total 5. CXR with patchy b/l opacities. Patient COVID swabbed, high risk therefore admitted to COVID MICU.    Bedside ECHO with good cardiac function, mild dilation in RV, collapsing IVC.    Upon arrival to ICU, power of  called back for which expressed that patient has instructed that she would like to be DNR/DNI without invasive measures. Power of  documentation faxed to hospital and proxy agreed to withdraw pressors at this time.

## 2020-05-08 NOTE — ED ADULT NURSE NOTE - OBJECTIVE STATEMENT
PT WAS HOME WITH HEALTH AID AND BECAME UNRESPONSIVE AND COLLAPSED ON THE FLOOR WHILE AMBULATING TO BATHROOM. ON EMS ARRIVAL PT WAS GIVEN 3 ROUNDS OF EPI AND HAD ROSC PRIOR TO ARRIVAL TO ED. MEDICAL HISTORY HTN ON ARRIVAL. PT WAS INTUBATED WITH ETOMIDATE AND SUCCS. PLACED ON HYPOTHERMIA BLANKET.

## 2020-05-08 NOTE — CONSULT NOTE ADULT - SUBJECTIVE AND OBJECTIVE BOX
[facility] SERVICE CONSULTATION NOTE    CC:    HPI:    On presentation, patient intubated, unresponsive, mottled appearing, cool extremities, coffee ground emesis via NGT. VS were T 94.8F, HR 67, BP initially 110s/60s but then dropped to 80s/40s with bradycardia (irregular rhythm on tele), RR 18, SPO2 98% intubated on mechanical ventilation at AC//18/5/100% FiO2. EKG and tele with irregular rhythm- intermittent sinus per, junctional beats, runs of AFib, incomplete sinus pauses pointing towards SSS; cardiology consulted and patient started on levophed and transcutaneous pacing with good capture and improvement in BP. Patient placed on cooling protocol. Patient given 2L NS, Vancomycin and Zosyn and started on protonix gtt. VBG revealed severe acidemia pH 6.4 therefore patient given 3 amps bicarb and RR increased to 24. Bedside ECHO with good cardiac function, mild dilation in RV, collapsing IVC. Labs then revealed hyperkalemia to 8.9 with Scr 5.65, therefore patient given calcium gluconate, insulin and dextrose; renal consulted.  ABG with mild improvement in pH 6.7 (however, this is before bicarb and RR adjusted), patient given additional 2 amps bicarb to total 5. CXR with patchy b/l opacities. Patient COVID swabbed, high risk therefore admitted to [facility] MICU.    ROS:  Otherwise negative, except as specified in HPI.    PMH: Unable to obtain    PSH: Unable to obtain    FH: Unable to obtain    SH: Unable to obtain    ALLERGIES: Unknown    MEDICATIONS: Unable to obtain    VITAL SIGNS:  ICU Vital Signs Last 24 Hrs  T(C): 34.9 (08 May 2020 10:20), Max: 34.9 (08 May 2020 10:20)  T(F): 94.8 (08 May 2020 10:20), Max: 94.8 (08 May 2020 10:20)  HR: 70 (08 May 2020 10:59) (67 - 120)  BP: 133/58 (08 May 2020 10:59) (89/40 - 140/68)  BP(mean): 81 (08 May 2020 10:15) (58 - 96)  RR: 18 (08 May 2020 09:55) (18 - 18)  SpO2: 98% (08 May 2020 09:55) (98% - 98%)      POCT Blood Glucose.: 113 mg/dL (08 May 2020 09:53)      PHYSICAL EXAM:  Constitutional: resting comfortably in bed, NAD  HEENT: NC/AT; PERRL, anicteric sclera; no oropharyngeal erythema or exudates; MMM  Neck: supple, no appreciable JVD  Respiratory: CTA B/L, no W/R/R; respirations appear non-labored, conversive in full sentences  Cardiovascular: +S1/S2, RRR  Gastrointestinal: abdomen soft, NT/ND  Extremities: WWP; no clubbing, cyanosis or edema  Vascular: 2+ radial, femoral, and DP/PT pulses B/L  Dermatologic: skin normal color and turgor; no visible rashes  Neurological:     LABS:                        9.2    6.86  )-----------( 117      ( 08 May 2020 10:35 )             31.7     05-08    137  |  112<H>  |  127<H>  ----------------------------<  258<H>  8.9<HH>   |  6<LL>  |  5.65<H>    Ca    7.1<L>      08 May 2020 10:35    TPro  4.9<L>  /  Alb  2.4<L>  /  TBili  0.2  /  DBili  x   /  AST  83<H>  /  ALT  48<H>  /  AlkPhos  139<H>  05-08    PT/INR - ( 08 May 2020 10:35 )   PT: 14.0 sec;   INR: 1.22          PTT - ( 08 May 2020 10:35 )  PTT:57.8 sec  Lactate, Blood: 8.2 mmol/L (05-08-20 @ 10:34)    CARDIAC MARKERS ( 08 May 2020 10:35 )  x     / 0.20 ng/mL / x     / x     / x              EKG: Reviewed.    RADIOLOGY & ADDITIONAL TESTS: Reviewed.          Rhythm behaving like SSS dissease, intermittent sinus per, junctional beats, random runs of AFib, incomplete sinus pauses --> pointing towards SSS- triggered by hypoxic event vs primary given age vs hyperkalemia. Prognosis terrible.   - left cordis placement with back up pacing if needed (if possible)   - treat hyperkalemia    Neuro  - sedated on propofol and fentanyl  - hypothermia protocol    CV  - Septic shock; on levophed, titrate to MAP >65  - S/p PEA arrest: suspicion for cardiac etiology given that collapsed at home, however multiple metabolic derangements on labs    - s/p epinephrine x3, ROSC after 20 minutes CPR    - cooling protocol    - collateral from [facility] as to how long patient down prior to chest compressions  - SSS: related to underlying metabolic derangements vs primary given age    - L cordis placement in case of TVP, external back-up pacing if needed    - fix metabolic derangements including hyperkalemia    - cardiology following     Resp  - CXR with bilateral patchy opacifications  - Intubated on MV settings Ac//24/5/100; titrate FiO2 as tolerated  - Trend ABG  - f/u COVID swab, obtain sputum cxs if possible  - f/u D-dimer, low suspicion for PE however RV slightly dilated    Renal  - AGMA- Likely 2/2 uremia and lactic acidemia.     - s/p 5 amps bicarb    - trend pH    - obtain BHB    Renal Failure- unclear if acute or acute on chronic    - Renal consulted for urgent dialysis given acidemia and hyperkalemia    - Indwelling Mejia catheter in place- monitor UOP    - obtain collateral    Hyperkalemia    - Lokelma 10, insulin 10, Dextrose, calcium gluconate    - treat acidemia, s/p 5 amps bicarb, f/u repeat ABG    - renal consulted for urgent dialysis     GI  - Concern for UGIB- coffee ground emesis    - continue Protonix infusion    - monitor Hgb, maintain active T&S    - consider r/o ischemic colitis given elevated concomittant elevated lactate.     ID- no leukocytosis and bandemia. CXR with b/l patchy opacifications concerning for COVID  - continue Vancomycin and Zosyn and 2L NS  -n   - f/u UA, Ucxs, Bcxs, COVID swab      Admit to COVID MICU. Discussed with Dr. Mac. [facility] SERVICE CONSULTATION NOTE    CC: cardiac arrest    HPI: 84F with unknown PMH who presents by EMS after collapse at home witnessed by HHA, found to be in PEA arrest. CPR performed in field with ROSC after 20 minutes. Patient intubated in field and brought to [facility].    On presentation, patient intubated, unresponsive, mottled appearing, cool extremities, coffee ground emesis via NGT. VS were T 94.8F, HR 67, BP initially 110s/60s but then dropped to 80s/40s with bradycardia (irregular rhythm on tele), RR 18, SPO2 98% intubated on mechanical ventilation at AC//18/5/100% FiO2. EKG and tele with irregular rhythm- intermittent sinus per, junctional beats, runs of AFib, incomplete sinus pauses pointing towards SSS; cardiology consulted and patient started on levophed and transcutaneous pacing with good capture and improvement in BP. Patient placed on cooling protocol. Patient given 2L NS, Vancomycin and Zosyn and started on protonix gtt. VBG revealed severe acidemia pH 6.4 therefore patient given 3 amps bicarb and RR increased to 24. Bedside ECHO with good cardiac function, mild dilation in RV, collapsing IVC. Labs then revealed hyperkalemia to 8.9 with Scr 5.65, therefore patient given calcium gluconate, insulin and dextrose; renal consulted.  ABG with mild improvement in pH 6.7 (however, this is before bicarb and RR adjusted), patient given additional 2 amps bicarb to total 5. CXR with patchy b/l opacities. Patient COVID swabbed, high risk therefore admitted to [facility] MICU.    ROS:  Otherwise negative, except as specified in HPI.    PMH: Unable to obtain    PSH: Unable to obtain    FH: Unable to obtain    SH: Unable to obtain    ALLERGIES: Unknown    MEDICATIONS: Unable to obtain    VITAL SIGNS:  ICU Vital Signs Last 24 Hrs  T(C): 34.9 (08 May 2020 10:20), Max: 34.9 (08 May 2020 10:20)  T(F): 94.8 (08 May 2020 10:20), Max: 94.8 (08 May 2020 10:20)  HR: 70 (08 May 2020 10:59) (67 - 120)  BP: 133/58 (08 May 2020 10:59) (89/40 - 140/68)  BP(mean): 81 (08 May 2020 10:15) (58 - 96)  RR: 18 (08 May 2020 09:55) (18 - 18)  SpO2: 98% (08 May 2020 09:55) (98% - 98%)      POCT Blood Glucose.: 113 mg/dL (08 May 2020 09:53)      PHYSICAL EXAM:  Constitutional: frail, mottled appearing, intubated/sedated  HEENT: NC/AT; PERRL, anicteric sclera; no oropharyngeal erythema or exudates; MMM  Neck: supple, no appreciable JVD  Respiratory: overbreathing vent, intubated, CTA B/L, no W/R/R  Cardiovascular: +S1/S2, RRR  Gastrointestinal: abdomen soft, NT/ND, NGT with coffee ground emesis   Extremities: mottled cool extremities with poor cap refill, no edema   Vascular: 2+ radial, and DP/PT pulses B/L  Dermatologic: mottled appearing, no visible rashes  Neurological: sedated, PERRL, overbreathing vent, not moving extremities, cough reflex    LABS:                        9.2    6.86  )-----------( 117      ( 08 May 2020 10:35 )             31.7     05-08    137  |  112<H>  |  127<H>  ----------------------------<  258<H>  8.9<HH>   |  6<LL>  |  5.65<H>    Ca    7.1<L>      08 May 2020 10:35    TPro  4.9<L>  /  Alb  2.4<L>  /  TBili  0.2  /  DBili  x   /  AST  83<H>  /  ALT  48<H>  /  AlkPhos  139<H>  05-08    PT/INR - ( 08 May 2020 10:35 )   PT: 14.0 sec;   INR: 1.22          PTT - ( 08 May 2020 10:35 )  PTT:57.8 sec  Lactate, Blood: 8.2 mmol/L (05-08-20 @ 10:34)    CARDIAC MARKERS ( 08 May 2020 10:35 )  x     / 0.20 ng/mL / x     / x     / x              EKG: Reviewed.    RADIOLOGY & ADDITIONAL TESTS: Reviewed.        Neuro  - sedated on propofol and fentanyl  - hypothermia protocol    CV  - Septic shock; on levophed, titrate to MAP >65  - S/p PEA arrest: suspicion for cardiac etiology given that collapsed at home, however multiple metabolic derangements on labs    - s/p epinephrine x3, ROSC after 20 minutes CPR    - cooling protocol    - collateral from [facility] as to how long patient down prior to chest compressions  - SSS: related to underlying metabolic derangements vs primary given age    - L cordis placement in case of TVP, external back-up pacing if needed    - fix metabolic derangements including hyperkalemia    - cardiology following     Resp  - CXR with bilateral patchy opacifications  - Intubated on MV settings Ac//24/5/100; titrate FiO2 as tolerated  - Trend ABG  - f/u COVID swab, obtain sputum cxs if possible  - f/u D-dimer, low suspicion for PE however RV slightly dilated    Renal  - AGMA- Likely 2/2 uremia and lactic acidemia.     - s/p 5 amps bicarb    - trend pH    - obtain BHB    Renal Failure- unclear if acute or acute on chronic    - Renal consulted for urgent dialysis given acidemia and hyperkalemia    - Indwelling Mejia catheter in place- monitor UOP    - obtain collateral    Hyperkalemia    - Lokelma 10, insulin 10, Dextrose, calcium gluconate    - treat acidemia, s/p 5 amps bicarb, f/u repeat ABG    - renal consulted for urgent dialysis     GI  - Concern for UGIB- coffee ground emesis    - continue Protonix infusion    - monitor Hgb, maintain active T&S    - consider r/o ischemic colitis given elevated concomittant elevated lactate.     ID- no leukocytosis and bandemia. CXR with b/l patchy opacifications concerning for COVID  - continue Vancomycin and Zosyn and 2L NS  - f/u UA, Ucxs, Bcxs, COVID swab      Admit to COVID MICU. Discussed with Dr. Mac.     ADDENDUM: Goals of care conversation with power of  when patient arrived on unit. Obtained history that patient has severe progressive Alzheimers dementia, with poor PO intake, h/o alcoholism, CKD, and "other medical problems." Patient's wishes are to be DNR/DNI. Discussed patient's diagnosis and poor prognosis. POW wishes to make patient comfortable, but wants trial of mechanical ventilation for 24hrs with antibiotics, fluids and pressors without heroic measures, no invasive line placement, no dialysis, with DNR status. POW to fax papers. [facility] SERVICE CONSULTATION NOTE    CC: cardiac arrest    HPI: 84F with unknown PMH who presents by EMS after collapse at home witnessed by HHA, found to be in PEA arrest. CPR performed in field with ROSC after 20 minutes. Patient intubated in field and brought to [facility].    On presentation, patient intubated, unresponsive, mottled appearing, cool extremities, coffee ground emesis via NGT. VS were T 94.8F, HR 67, BP initially 110s/60s but then dropped to 80s/40s with bradycardia (irregular rhythm on tele), RR 18, SPO2 98% intubated on mechanical ventilation at AC//18/5/100% FiO2. EKG and tele with irregular rhythm- intermittent sinus per, junctional beats, runs of AFib, incomplete sinus pauses pointing towards SSS; cardiology consulted and patient started on levophed and transcutaneous pacing with good capture and improvement in BP. Patient placed on cooling protocol. Patient given 2L NS, Vancomycin and Zosyn and started on protonix gtt. VBG revealed severe acidemia pH 6.4 therefore patient given 3 amps bicarb and RR increased to 24. Bedside ECHO with good cardiac function, mild dilation in RV, collapsing IVC. Labs then revealed hyperkalemia to 8.9 with Scr 5.65, therefore patient given calcium gluconate, insulin and dextrose; renal consulted.  ABG with mild improvement in pH 6.7 (however, this is before bicarb and RR adjusted), patient given additional 2 amps bicarb to total 5. CXR with patchy b/l opacities. Patient COVID swabbed, high risk therefore admitted to [facility] MICU.    ROS:  Otherwise negative, except as specified in HPI.    PMH: Unable to obtain    PSH: Unable to obtain    FH: Unable to obtain    SH: Unable to obtain    ALLERGIES: Unknown    MEDICATIONS: Unable to obtain    VITAL SIGNS:  ICU Vital Signs Last 24 Hrs  T(C): 34.9 (08 May 2020 10:20), Max: 34.9 (08 May 2020 10:20)  T(F): 94.8 (08 May 2020 10:20), Max: 94.8 (08 May 2020 10:20)  HR: 70 (08 May 2020 10:59) (67 - 120)  BP: 133/58 (08 May 2020 10:59) (89/40 - 140/68)  BP(mean): 81 (08 May 2020 10:15) (58 - 96)  RR: 18 (08 May 2020 09:55) (18 - 18)  SpO2: 98% (08 May 2020 09:55) (98% - 98%)      POCT Blood Glucose.: 113 mg/dL (08 May 2020 09:53)      PHYSICAL EXAM:  Constitutional: frail, mottled appearing, intubated/sedated  HEENT: NC/AT; PERRL, anicteric sclera; no oropharyngeal erythema or exudates; MMM  Neck: supple, no appreciable JVD  Respiratory: overbreathing vent, intubated, CTA B/L, no W/R/R  Cardiovascular: +S1/S2, RRR  Gastrointestinal: abdomen soft, NT/ND, NGT with coffee ground emesis   Extremities: mottled cool extremities with poor cap refill, no edema   Vascular: 2+ radial, and DP/PT pulses B/L  Dermatologic: mottled appearing, no visible rashes  Neurological: sedated, PERRL, overbreathing vent, not moving extremities, cough reflex    LABS:                        9.2    6.86  )-----------( 117      ( 08 May 2020 10:35 )             31.7     05-08    137  |  112<H>  |  127<H>  ----------------------------<  258<H>  8.9<HH>   |  6<LL>  |  5.65<H>    Ca    7.1<L>      08 May 2020 10:35    TPro  4.9<L>  /  Alb  2.4<L>  /  TBili  0.2  /  DBili  x   /  AST  83<H>  /  ALT  48<H>  /  AlkPhos  139<H>  05-08    PT/INR - ( 08 May 2020 10:35 )   PT: 14.0 sec;   INR: 1.22          PTT - ( 08 May 2020 10:35 )  PTT:57.8 sec  Lactate, Blood: 8.2 mmol/L (05-08-20 @ 10:34)    CARDIAC MARKERS ( 08 May 2020 10:35 )  x     / 0.20 ng/mL / x     / x     / x              EKG: Reviewed.    RADIOLOGY & ADDITIONAL TESTS: Reviewed.        Neuro  - sedated on propofol and fentanyl  - hypothermia protocol    CV  - Septic shock; on levophed, titrate to MAP >65  - S/p PEA arrest: suspicion for cardiac etiology given that collapsed at home, however multiple metabolic derangements on labs    - s/p epinephrine x3, ROSC after 20 minutes CPR    - cooling protocol    - collateral from [facility] as to how long patient down prior to chest compressions  - SSS: related to underlying metabolic derangements vs primary given age    - L cordis placement in case of TVP, external back-up pacing if needed    - fix metabolic derangements including hyperkalemia    - cardiology following     Resp  - CXR with bilateral patchy opacifications  - Intubated on MV settings Ac//24/5/100; titrate FiO2 as tolerated  - Trend ABG  - f/u COVID swab, obtain sputum cxs if possible  - f/u D-dimer, low suspicion for PE however RV slightly dilated    Renal  - AGMA- Likely 2/2 uremia and lactic acidemia.     - s/p 5 amps bicarb    - trend pH    - obtain BHB    Renal Failure- unclear if acute or acute on chronic    - Renal consulted for urgent dialysis given acidemia and hyperkalemia    - Indwelling Mejia catheter in place- monitor UOP    - obtain collateral    Hyperkalemia    - Lokelma 10, insulin 10, Dextrose, calcium gluconate    - treat acidemia, s/p 5 amps bicarb, f/u repeat ABG    - renal consulted for urgent dialysis     GI  - Concern for UGIB- coffee ground emesis    - continue Protonix infusion    - monitor Hgb, maintain active T&S    - consider r/o ischemic colitis given elevated concomittant elevated lactate.     ID- no leukocytosis and bandemia. CXR with b/l patchy opacifications concerning for COVID  - continue Vancomycin and Zosyn and 2L NS  - f/u UA, Ucxs, Bcxs, COVID swab      Admit to COVID MICU. Discussed with Dr. Mac.     ADDENDUM: Goals of care conversation with power of  when patient arrived on unit. Obtained history that patient has severe progressive Alzheimers dementia, with poor PO intake, h/o alcoholism, CKD, and "other medical problems." Patient's wishes are to be DNR/DNI. Discussed patient's diagnosis and poor prognosis. POW wishes to make patient comfortable, wants to continue mechanica ventilation but withdraw all other care (see goals of care note) and allow patient to pass peacefully. POW to fax papers.

## 2020-05-08 NOTE — H&P ADULT - NSHPLABSRESULTS_GEN_ALL_CORE
LABS:                        7.1    2.39  )-----------( 87       ( 08 May 2020 14:21 )             23.5     05-08    137  |  112<H>  |  127<H>  ----------------------------<  258<H>  8.9<HH>   |  6<LL>  |  5.65<H>    Ca    7.1<L>      08 May 2020 10:35    TPro  4.9<L>  /  Alb  2.4<L>  /  TBili  0.2  /  DBili  x   /  AST  83<H>  /  ALT  48<H>  /  AlkPhos  139<H>  05-08    PT/INR - ( 08 May 2020 14:21 )   PT: 15.7 sec;   INR: 1.36          PTT - ( 08 May 2020 14:21 )  PTT:31.3 sec

## 2020-05-09 LAB — SARS-COV-2 RNA SPEC QL NAA+PROBE: DETECTED

## 2020-05-13 LAB
CULTURE RESULTS: SIGNIFICANT CHANGE UP
CULTURE RESULTS: SIGNIFICANT CHANGE UP
SPECIMEN SOURCE: SIGNIFICANT CHANGE UP
SPECIMEN SOURCE: SIGNIFICANT CHANGE UP

## 2020-05-20 DIAGNOSIS — R65.21 SEVERE SEPSIS WITH SEPTIC SHOCK: ICD-10-CM

## 2020-05-20 DIAGNOSIS — I46.8 CARDIAC ARREST DUE TO OTHER UNDERLYING CONDITION: ICD-10-CM

## 2020-05-20 DIAGNOSIS — E87.5 HYPERKALEMIA: ICD-10-CM

## 2020-05-20 DIAGNOSIS — I10 ESSENTIAL (PRIMARY) HYPERTENSION: ICD-10-CM

## 2020-05-20 DIAGNOSIS — R57.1 HYPOVOLEMIC SHOCK: ICD-10-CM

## 2020-05-20 DIAGNOSIS — A41.9 SEPSIS, UNSPECIFIED ORGANISM: ICD-10-CM

## 2020-05-20 DIAGNOSIS — F02.80 DEMENTIA IN OTHER DISEASES CLASSIFIED ELSEWHERE, UNSPECIFIED SEVERITY, WITHOUT BEHAVIORAL DISTURBANCE, PSYCHOTIC DISTURBANCE, MOOD DISTURBANCE, AND ANXIETY: ICD-10-CM

## 2020-05-20 DIAGNOSIS — Z51.5 ENCOUNTER FOR PALLIATIVE CARE: ICD-10-CM

## 2020-05-20 DIAGNOSIS — E11.9 TYPE 2 DIABETES MELLITUS WITHOUT COMPLICATIONS: ICD-10-CM

## 2020-05-20 DIAGNOSIS — G30.9 ALZHEIMER'S DISEASE, UNSPECIFIED: ICD-10-CM

## 2020-05-20 DIAGNOSIS — I48.91 UNSPECIFIED ATRIAL FIBRILLATION: ICD-10-CM

## 2020-05-20 DIAGNOSIS — U07.1 COVID-19: ICD-10-CM

## 2020-05-20 DIAGNOSIS — E87.2 ACIDOSIS: ICD-10-CM

## 2020-05-20 DIAGNOSIS — N17.9 ACUTE KIDNEY FAILURE, UNSPECIFIED: ICD-10-CM

## 2020-05-20 DIAGNOSIS — Z66 DO NOT RESUSCITATE: ICD-10-CM

## 2020-05-20 DIAGNOSIS — I49.5 SICK SINUS SYNDROME: ICD-10-CM
